# Patient Record
Sex: FEMALE | Race: WHITE | Employment: STUDENT | URBAN - NONMETROPOLITAN AREA
[De-identification: names, ages, dates, MRNs, and addresses within clinical notes are randomized per-mention and may not be internally consistent; named-entity substitution may affect disease eponyms.]

---
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
## 2021-09-12 ENCOUNTER — APPOINTMENT (EMERGENCY)
Dept: RADIOLOGY | Facility: HOSPITAL | Age: 21
End: 2021-09-12
Payer: COMMERCIAL

## 2021-09-12 ENCOUNTER — HOSPITAL ENCOUNTER (EMERGENCY)
Facility: HOSPITAL | Age: 21
Discharge: HOME | End: 2021-09-12
Attending: EMERGENCY MEDICINE | Admitting: EMERGENCY MEDICINE
Payer: COMMERCIAL

## 2021-09-12 VITALS
BODY MASS INDEX: 31.01 KG/M2 | SYSTOLIC BLOOD PRESSURE: 124 MMHG | HEART RATE: 93 BPM | RESPIRATION RATE: 18 BRPM | HEIGHT: 55 IN | OXYGEN SATURATION: 100 % | DIASTOLIC BLOOD PRESSURE: 73 MMHG | WEIGHT: 134 LBS | TEMPERATURE: 98.9 F

## 2021-09-12 DIAGNOSIS — R20.2 FACIAL TINGLING SENSATION: ICD-10-CM

## 2021-09-12 DIAGNOSIS — S29.011A INTERCOSTAL MUSCLE STRAIN, INITIAL ENCOUNTER: ICD-10-CM

## 2021-09-12 DIAGNOSIS — R07.89 CHEST WALL PAIN: Primary | ICD-10-CM

## 2021-09-12 PROCEDURE — 93005 ELECTROCARDIOGRAM TRACING: CPT

## 2021-09-12 PROCEDURE — 99284 EMERGENCY DEPT VISIT MOD MDM: CPT | Performed by: PHYSICIAN ASSISTANT

## 2021-09-12 PROCEDURE — 99285 EMERGENCY DEPT VISIT HI MDM: CPT

## 2021-09-12 PROCEDURE — 71045 X-RAY EXAM CHEST 1 VIEW: CPT

## 2021-09-12 RX ORDER — LIDOCAINE 50 MG/G
1 PATCH TOPICAL DAILY
Qty: 15 PATCH | Refills: 0 | Status: SHIPPED | OUTPATIENT
Start: 2021-09-12

## 2021-09-12 NOTE — ED PROVIDER NOTES
History  Chief Complaint   Patient presents with    Chest Pain     pt c/o intermittent chest pain after being dx with covid yesterday, pt also c/o nerve pain that started in august     Patient is a 78-year-old female presenting to the ED for evaluation of chest pain x2 days  Patient tested positive for COVID yesterday and has been having symptoms of cough, fatigue and chest pain since that time  Chest pain is localized to the left side of her chest and axilla and is worse when she moves her left arm  Patient states, "I think I may have pulled a muscle"  She denies shortness of breath or palpitations  Pain is not exacerbated with exertion or respirations  Patient is not on any form of estrogen or birth control  No hx of PE/DVT  She did not receive her COVID vaccines  She also reports a strange sensation on the right side of her face, primarily over her cheek and around periorbital region x1 month  She states that she went to [facility] a month ago and thought she was having a stroke; however, she says they made her wait for hours so she signed out AMA  Since that time she has had a tingling "swollen" sensation on the right of her face that won't go away  Denies facial asymmetry, numbness, weakness or dysarthria  None       Past Medical History:   Diagnosis Date    Asthma        History reviewed  No pertinent surgical history  History reviewed  No pertinent family history  I have reviewed and agree with the history as documented  E-Cigarette/Vaping    E-Cigarette Use Current Every Day User      E-Cigarette/Vaping Substances     Social History     Tobacco Use    Smoking status: Never Smoker    Smokeless tobacco: Never Used   Vaping Use    Vaping Use: Every day   Substance Use Topics    Alcohol use: Never    Drug use: Never       Review of Systems   Constitutional: Negative for appetite change, chills, fatigue and fever     HENT: Negative for congestion, rhinorrhea, sinus pressure, sinus pain and sore throat  Eyes: Negative for photophobia and visual disturbance  Respiratory: Negative for cough, shortness of breath and wheezing  Cardiovascular: Positive for chest pain  Negative for palpitations and leg swelling  Gastrointestinal: Negative for abdominal pain, blood in stool, constipation, diarrhea, nausea and vomiting  Genitourinary: Negative for difficulty urinating, dysuria, flank pain, frequency, hematuria and urgency  Musculoskeletal: Negative for arthralgias, back pain, joint swelling, myalgias and neck pain  Skin: Negative for color change, pallor and rash  Neurological: Negative for dizziness, syncope, weakness, light-headedness and headaches  All other systems reviewed and are negative  Physical Exam  Physical Exam  Vitals and nursing note reviewed  Constitutional:       General: She is awake  Appearance: Normal appearance  She is well-developed  She is not toxic-appearing or diaphoretic  HENT:      Head: Normocephalic and atraumatic  Right Ear: External ear normal       Left Ear: External ear normal       Nose: Nose normal       Mouth/Throat:      Lips: Pink  Mouth: Mucous membranes are moist    Eyes:      General: Lids are normal  No visual field deficit or scleral icterus  Conjunctiva/sclera: Conjunctivae normal       Pupils: Pupils are equal, round, and reactive to light  Cardiovascular:      Rate and Rhythm: Normal rate and regular rhythm  Pulses: Normal pulses  Radial pulses are 2+ on the right side and 2+ on the left side  Heart sounds: Normal heart sounds, S1 normal and S2 normal    Pulmonary:      Effort: Pulmonary effort is normal  No accessory muscle usage  Breath sounds: Normal breath sounds  No stridor  No decreased breath sounds, wheezing, rhonchi or rales  Chest:          Comments: Reproducible pain over left upper intercostal muscles  No crepitus or ecchymosis  Chest rise equal and symmetric     Abdominal: General: Abdomen is flat  Bowel sounds are normal  There is no distension  Palpations: Abdomen is soft  Tenderness: There is no abdominal tenderness  There is no right CVA tenderness, left CVA tenderness, guarding or rebound  Musculoskeletal:      Cervical back: Full passive range of motion without pain and neck supple  No signs of trauma  No pain with movement  Right lower leg: No edema  Left lower leg: No edema  Lymphadenopathy:      Cervical: No cervical adenopathy  Skin:     General: Skin is warm and dry  Capillary Refill: Capillary refill takes less than 2 seconds  Coloration: Skin is not cyanotic, jaundiced or pale  Neurological:      General: No focal deficit present  Mental Status: She is alert and oriented to person, place, and time  GCS: GCS eye subscore is 4  GCS verbal subscore is 5  GCS motor subscore is 6  Cranial Nerves: Cranial nerves are intact  No cranial nerve deficit, dysarthria or facial asymmetry  Sensory: Sensation is intact  No sensory deficit  Motor: Motor function is intact  No pronator drift  Coordination: Coordination is intact  Finger-Nose-Finger Test normal       Gait: Gait normal       Comments: No focal neurological deficits  Sensation equal and intact bilaterally  No facial asymmetry or dysarthria  Strength 5/5 bilaterally  Coordination and gait normal     Psychiatric:         Mood and Affect: Mood normal          Speech: Speech normal          Behavior: Behavior is cooperative           Vital Signs  ED Triage Vitals [09/12/21 1810]   Temperature Pulse Respirations Blood Pressure SpO2   98 9 °F (37 2 °C) (!) 111 18 128/71 99 %      Temp Source Heart Rate Source Patient Position - Orthostatic VS BP Location FiO2 (%)   Oral Monitor Sitting Left arm --      Pain Score       --           Vitals:    09/12/21 1810 09/12/21 1930 09/12/21 2034   BP: 128/71 124/73    Pulse: (!) 111 100 93   Patient Position - Orthostatic VS: Sitting Sitting          Visual Acuity      ED Medications  Medications - No data to display    Diagnostic Studies  Results Reviewed     None                 XR chest 1 view portable   Final Result by Mayra Downey MD (09/13 4120)      No acute cardiopulmonary disease  Workstation performed: [id number]                    Procedures  ECG 12 Lead Documentation Only    Date/Time: 9/12/2021 8:50 PM  Performed by: Moncho Cast PA-C  Authorized by: Moncho Cast PA-C     Indications / Diagnosis:  Cp  ECG reviewed by me, the ED Provider: yes    Patient location:  ED  Previous ECG:     Previous ECG:  Unavailable    Comparison to cardiac monitor: Yes    Interpretation:     Interpretation: non-specific    Rate:     ECG rate:  82    ECG rate assessment: normal    Rhythm:     Rhythm: sinus rhythm    Ectopy:     Ectopy: none    QRS:     QRS axis:  Right  Conduction:     Conduction: normal    ST segments:     ST segments:  Normal  T waves:     T waves: non-specific    Comments:      No STEMI  ED Course                     PERC Rule for PE      Most Recent Value   PERC Rule for PE   Age >=50  0 Filed at: 09/12/2021 2000   HR >=100  0 Filed at: 09/12/2021 2000   O2 Sat on room air < 95%  0 Filed at: 09/12/2021 2000   History of PE or DVT  0 Filed at: 09/12/2021 2000   Recent trauma or surgery  0 Filed at: 09/12/2021 2000   Hemoptysis  0 Filed at: 09/12/2021 2000   Exogenous estrogen  0 Filed at: 09/12/2021 2000   Unilateral leg swelling  0 Filed at: 09/12/2021 2000   PERC Rule for PE Results  0 Filed at: 09/12/2021 2000                            MDM  Number of Diagnoses or Management Options  Chest wall pain  Facial tingling sensation  Intercostal muscle strain, initial encounter  Diagnosis management comments: Patient is a 62-year-old female presenting to the ED for evaluation of chest pain x2 days  EKG normal sinus rhythm  CXR shows no acute cardiopulmonary disease   PERC score 0 (initial HR in triage>100; however subsequent HRs <100), no hpoxia, no pleuritic chest pain, no SOB  Chest pain is reproducible and likely secondary to coughing/muscle strain  Provided neurology follow-up information for patient's odd sensation in right side of face  She is neurologically intact and has no loss of sensation in the area she reports "numnbess/tingling"  Advised patient to follow up with PCP  The management plan was discussed in detail with the patient at bedside and all questions were answered  Prior to discharge, verbal and written instructions provided  ED return precautions discussed in detail  The patient verbalized understanding of our discussion and plan of care, and agrees to return to the Emergency Department for concerns and progression of illness  Amount and/or Complexity of Data Reviewed  Tests in the radiology section of CPT®: ordered and reviewed    Patient Progress  Patient progress: stable      Disposition  Final diagnoses:   Chest wall pain   Facial tingling sensation   Intercostal muscle strain, initial encounter     Time reflects when diagnosis was documented in both MDM as applicable and the Disposition within this note     Time User Action Codes Description Comment    9/12/2021  8:43 PM Kathrynn Chen Add [R07 89] Chest wall pain     9/12/2021  8:45 PM Kathrynn Chen Add [R20 9] Skin sensation disturbance     9/12/2021  8:45 PM Maggie Hicks Remove [R20 9] Skin sensation disturbance     9/12/2021  8:46 PM Maggie Hicks Add [R20 2] Facial tingling sensation     9/12/2021  8:47 PM Kathrynn Chen Add [Q46 966K] Intercostal muscle strain, initial encounter       ED Disposition     ED Disposition Condition Date/Time Comment    Discharge Stable Sun Sep 12, 2021  8:46 PM April Kelly discharge to home/self care              Follow-up Information     Follow up With Specialties Details Why Contact Info Additional [address] [facility] Emergency Department Emergency Medicine  If symptoms worsen [address] [facility] [address] [facility] Emergency Department, [address] [facility], [facility], [address] [facility], [address]    Neurology [facility] Neurology  As needed [address]  [phone] Neurology [facility], [address] [place] [address], [address] [facility], [address] [facility]          Discharge Medication List as of 9/12/2021  9:11 PM      START taking these medications    Details   lidocaine (LIDODERM) 5 % Apply 1 patch topically daily Remove & Discard patch within 12 hours or as directed by MD, Starting Sun 9/12/2021, Print           No discharge procedures on file      PDMP Review     None          ED Provider  Electronically Signed by           Christelle Lai PA-C  09/13/21 4412

## 2021-09-13 LAB
ATRIAL RATE: 82 BPM
P AXIS: 48 DEGREES
PR INTERVAL: 162 MS
QRS AXIS: 106 DEGREES
QRSD INTERVAL: 86 MS
QT INTERVAL: 334 MS
QTC INTERVAL: 390 MS
T WAVE AXIS: 4 DEGREES
VENTRICULAR RATE: 82 BPM

## 2021-09-13 PROCEDURE — 93010 ELECTROCARDIOGRAM REPORT: CPT | Performed by: INTERNAL MEDICINE

## 2021-09-28 ENCOUNTER — HOSPITAL ENCOUNTER (EMERGENCY)
Facility: HOSPITAL | Age: 21
Discharge: HOME | End: 2021-09-28
Attending: EMERGENCY MEDICINE
Payer: COMMERCIAL

## 2021-09-28 VITALS
DIASTOLIC BLOOD PRESSURE: 70 MMHG | WEIGHT: 134.04 LBS | HEART RATE: 92 BPM | HEIGHT: 55 IN | BODY MASS INDEX: 31.02 KG/M2 | SYSTOLIC BLOOD PRESSURE: 118 MMHG | TEMPERATURE: 97.6 F | OXYGEN SATURATION: 100 % | RESPIRATION RATE: 18 BRPM

## 2021-09-28 DIAGNOSIS — O03.9 MISCARRIAGE: Primary | ICD-10-CM

## 2021-09-28 LAB
ABO GROUP BLD: NORMAL
B-HCG SERPL-ACNC: <2 MIU/ML
BASOPHILS # BLD AUTO: 0.02 THOUSANDS/ΜL (ref 0–0.1)
BASOPHILS NFR BLD AUTO: 0 % (ref 0–1)
EOSINOPHIL # BLD AUTO: 0.1 THOUSAND/ΜL (ref 0–0.61)
EOSINOPHIL NFR BLD AUTO: 2 % (ref 0–6)
ERYTHROCYTE [DISTWIDTH] IN BLOOD BY AUTOMATED COUNT: 13.6 % (ref 11.6–15.1)
HCT VFR BLD AUTO: 40.2 % (ref 34.8–46.1)
HGB BLD-MCNC: 12.9 G/DL (ref 11.5–15.4)
LYMPHOCYTES # BLD AUTO: 1.69 THOUSANDS/ΜL (ref 0.6–4.47)
LYMPHOCYTES NFR BLD AUTO: 36 % (ref 14–44)
MCH RBC QN AUTO: 27.6 PG (ref 26.8–34.3)
MCHC RBC AUTO-ENTMCNC: 32.1 G/DL (ref 31.4–37.4)
MCV RBC AUTO: 86 FL (ref 82–98)
MONOCYTES # BLD AUTO: 0.47 THOUSAND/ΜL (ref 0.17–1.22)
MONOCYTES NFR BLD AUTO: 10 % (ref 4–12)
NEUTROPHILS # BLD AUTO: 2.44 THOUSANDS/ΜL (ref 1.85–7.62)
NEUTS SEG NFR BLD AUTO: 52 % (ref 43–75)
PLATELET # BLD AUTO: 386 THOUSANDS/UL (ref 149–390)
PMV BLD AUTO: 10.5 FL (ref 8.9–12.7)
RBC # BLD AUTO: 4.68 MILLION/UL (ref 3.81–5.12)
RH BLD: POSITIVE
WBC # BLD AUTO: 4.72 THOUSAND/UL (ref 4.31–10.16)

## 2021-09-28 PROCEDURE — 99284 EMERGENCY DEPT VISIT MOD MDM: CPT

## 2021-09-28 PROCEDURE — 99282 EMERGENCY DEPT VISIT SF MDM: CPT | Performed by: EMERGENCY MEDICINE

## 2021-09-28 PROCEDURE — 86901 BLOOD TYPING SEROLOGIC RH(D): CPT | Performed by: EMERGENCY MEDICINE

## 2021-09-28 PROCEDURE — 86900 BLOOD TYPING SEROLOGIC ABO: CPT | Performed by: EMERGENCY MEDICINE

## 2021-09-28 PROCEDURE — 84702 CHORIONIC GONADOTROPIN TEST: CPT | Performed by: EMERGENCY MEDICINE

## 2021-09-28 PROCEDURE — 36415 COLL VENOUS BLD VENIPUNCTURE: CPT | Performed by: EMERGENCY MEDICINE

## 2021-09-28 PROCEDURE — 85025 COMPLETE CBC W/AUTO DIFF WBC: CPT | Performed by: EMERGENCY MEDICINE

## 2022-01-21 ENCOUNTER — TELEPHONE (OUTPATIENT)
Dept: NEUROLOGY | Facility: CLINIC | Age: 22
End: 2022-01-21

## 2022-01-21 NOTE — TELEPHONE ENCOUNTER
1/21 LM HOME # REQUEST PT CALL W/ NAME OF PCP - INSUR REQUIRES AN INSURANCE FERRAL - CELL NOT IN SERVICE    1/25 LM HOME # REQUEST PT CALL W/ NAME OF PCP - INSUR REQUIRES AN INSURANCE FERRAL - CELL NOT IN SERVICE    1/27 LM ON HOME PHONE FOR PT APPT RAMIRO'D SINCE SHE DID NOT CALL W/ NAME OF PCP IN ORDER TO OBTAIN INSUR REF - CELL NOT IN SERV

## 2022-01-24 ENCOUNTER — TELEPHONE (OUTPATIENT)
Dept: NEUROLOGY | Facility: CLINIC | Age: 22
End: 2022-01-24

## 2025-02-04 ENCOUNTER — HOSPITAL ENCOUNTER (EMERGENCY)
Facility: HOSPITAL | Age: 25
Discharge: HOME | End: 2025-02-04
Attending: EMERGENCY MEDICINE | Admitting: EMERGENCY MEDICINE
Payer: COMMERCIAL

## 2025-02-04 VITALS
SYSTOLIC BLOOD PRESSURE: 133 MMHG | TEMPERATURE: 98.7 F | RESPIRATION RATE: 16 BRPM | OXYGEN SATURATION: 99 % | HEIGHT: 56 IN | DIASTOLIC BLOOD PRESSURE: 63 MMHG | WEIGHT: 154 LBS | BODY MASS INDEX: 34.64 KG/M2 | HEART RATE: 103 BPM

## 2025-02-04 DIAGNOSIS — J10.1 INFLUENZA A: Primary | ICD-10-CM

## 2025-02-04 DIAGNOSIS — J45.901 ASTHMA EXACERBATION: ICD-10-CM

## 2025-02-04 DIAGNOSIS — R50.9 FEVER: ICD-10-CM

## 2025-02-04 LAB
FLUAV AG UPPER RESP QL IA.RAPID: POSITIVE
FLUBV AG UPPER RESP QL IA.RAPID: NEGATIVE
SARS-COV+SARS-COV-2 AG RESP QL IA.RAPID: NEGATIVE

## 2025-02-04 PROCEDURE — 87811 SARS-COV-2 COVID19 W/OPTIC: CPT

## 2025-02-04 PROCEDURE — 99284 EMERGENCY DEPT VISIT MOD MDM: CPT | Performed by: EMERGENCY MEDICINE

## 2025-02-04 PROCEDURE — 99283 EMERGENCY DEPT VISIT LOW MDM: CPT

## 2025-02-04 PROCEDURE — 87804 INFLUENZA ASSAY W/OPTIC: CPT

## 2025-02-04 RX ORDER — PREDNISONE 50 MG/1
50 TABLET ORAL DAILY
Qty: 4 TABLET | Refills: 0 | Status: SHIPPED | OUTPATIENT
Start: 2025-02-04

## 2025-02-04 RX ORDER — PREDNISONE 20 MG/1
60 TABLET ORAL ONCE
Status: COMPLETED | OUTPATIENT
Start: 2025-02-04 | End: 2025-02-04

## 2025-02-04 RX ADMIN — PREDNISONE 60 MG: 20 TABLET ORAL at 15:00

## 2025-02-04 NOTE — ED PROVIDER NOTES
Time reflects when diagnosis was documented in both MDM as applicable and the Disposition within this note       Time User Action Codes Description Comment    2/4/2025  2:55 PM Shivani Null [J10.1] Influenza A     2/4/2025  2:55 PM Shivani Null [J45.901] Asthma exacerbation     2/4/2025  3:06 PM Shivani Null [R50.9] Fever           ED Disposition       ED Disposition   Discharge    Condition   Good    Date/Time   Tue Feb 4, 2025  2:55 PM    Comment   Smita Carbajalado discharge to home/self care.             Assessment & Plan       Medical Decision Making  This is a 24-year-old female who presents here today for flulike symptoms.  She began getting sick overnight 2/2 to 2/3 with fevers, congestion, coughing, exacerbation of her asthma.  She has been using albuterol with some improvement of symptoms.  She has been taking DayQuil for cough, as well as ibuprofen.  She did go to urgent care earlier today, and was swabbed for COVID and flu but does not expect to get the results for a couple of days.  She then developed a fever of 103 at home, prompting her to come to the ER for evaluation.  She denies any nausea, vomiting, diarrhea, known sick contacts.  She most recently took something for fever at around noon.  She takes no controller medications for her asthma.    Review of systems: Otherwise negative unless stated above    She is well-appearing, no acute distress.  She has mild diffuse wheezing but no respiratory distress or other adventitious breath sounds.  Exam is otherwise unremarkable.  She was swabbed in triage, and is positive for influenza A.  She is within 48 hours of symptom onset so is a candidate for Tamiflu with history of asthma.  I discussed with her risk and benefits of treatment, and she declines.  I am not concerned about secondary pneumonia, severe asthma exacerbation for which she requires IV medications or multiple nebulizers.  We will  give her a spacer as she does not have 1 at home.  I discussed with patient and significant other findings, continued symptom management at home, following up for further evaluation, and indications for return, and they expressed understanding with this plan.    Problems Addressed:  Asthma exacerbation: acute illness or injury  Fever: acute illness or injury  Influenza A: acute illness or injury    Amount and/or Complexity of Data Reviewed  Labs: ordered. Decision-making details documented in ED Course.    Risk  Prescription drug management.             Medications   predniSONE tablet 60 mg (60 mg Oral Given 2/4/25 1500)       ED Risk Strat Scores                                              History of Present Illness       Chief Complaint   Patient presents with    Flu Symptoms     Fever 2 day ago, went to [facility] express care was not able to have results back for flu. Here for flu swab.       Past Medical History:   Diagnosis Date    Asthma       History reviewed. No pertinent surgical history.   History reviewed. No pertinent family history.   Social History     Tobacco Use    Smoking status: Never    Smokeless tobacco: Never   Vaping Use    Vaping status: Every Day   Substance Use Topics    Alcohol use: Never    Drug use: Never      E-Cigarette/Vaping    E-Cigarette Use Current Every Day User       E-Cigarette/Vaping Substances      I have reviewed and agree with the history as documented.       Flu Symptoms      Review of Systems        Objective       ED Triage Vitals [02/04/25 1323]   Temperature Pulse Blood Pressure Respirations SpO2 Patient Position - Orthostatic VS   98.7 °F (37.1 °C) (!) 113 133/63 16 98 % Sitting      Temp Source Heart Rate Source BP Location FiO2 (%) Pain Score    Oral Monitor Left arm -- --      Vitals      Date and Time Temp Pulse SpO2 Resp BP Pain Score FACES Pain Rating User   02/04/25 1455 -- 103 99 % -- -- -- -- AM   02/04/25 1323 98.7 °F (37.1 °C) 113 98 % 16 133/63 -- -- FB             Physical Exam  Vitals and nursing note reviewed.   Constitutional:       General: She is not in acute distress.     Appearance: She is well-developed.   HENT:      Head: Normocephalic and atraumatic.      Right Ear: Tympanic membrane, ear canal and external ear normal.      Left Ear: Tympanic membrane, ear canal and external ear normal.      Mouth/Throat:      Mouth: Mucous membranes are moist.      Pharynx: Oropharynx is clear.      Tonsils: No tonsillar exudate.   Eyes:      Conjunctiva/sclera: Conjunctivae normal.      Pupils: Pupils are equal, round, and reactive to light.   Neck:      Trachea: No tracheal deviation.   Cardiovascular:      Rate and Rhythm: Normal rate and regular rhythm.      Heart sounds: Normal heart sounds.      Comments: Tachycardic in triage, HR around 100 on my evaluation  Pulmonary:      Effort: Pulmonary effort is normal. No tachypnea, accessory muscle usage, prolonged expiration or respiratory distress.      Breath sounds: No decreased air movement. Wheezing (mild, diffuse) present.      Comments: Speaking easily in full sentences without conversational dyspnea.  No cough  Abdominal:      General: Bowel sounds are normal. There is no distension.      Palpations: Abdomen is soft.      Tenderness: There is no abdominal tenderness.   Musculoskeletal:      Cervical back: Normal range of motion.   Lymphadenopathy:      Cervical: No cervical adenopathy.   Skin:     General: Skin is warm and dry.      Findings: No rash.   Neurological:      Mental Status: She is alert and oriented to person, place, and time.      GCS: GCS eye subscore is 4. GCS verbal subscore is 5. GCS motor subscore is 6.   Psychiatric:         Mood and Affect: Mood and affect normal.         Behavior: Behavior normal.         Results Reviewed       Procedure Component Value Units Date/Time    FLU/COVID Rapid Antigen (30 min. TAT) - Preferred screening test in ED [[id number]]  (Abnormal) Collected: 02/04/25 1325    Lab  Status: Final result Specimen: Nares from Nose Updated: 02/04/25 1356     SARS COV Rapid Antigen Negative     Influenza A Rapid Antigen Positive     Influenza B Rapid Antigen Negative    Narrative:      This test has been performed using the [employer] Isabela 2 FLU+SARS Antigen test under the Emergency Use Authorization (EUA). This test has been validated by the  and verified by the performing laboratory. The Isabela uses lateral flow immunofluorescent sandwich assay to detect SARS-COV, Influenza A and Influenza B Antigen.     The [employer] Isabela 2 SARS Antigen test does not differentiate between SARS-CoV and SARS-CoV-2.     Negative results are presumptive and may be confirmed with a molecular assay, if necessary, for patient management. Negative results do not rule out SARS-CoV-2 or influenza infection and should not be used as the sole basis for treatment or patient management decisions. A negative test result may occur if the level of antigen in a sample is below the limit of detection of this test.     Positive results are indicative of the presence of viral antigens, but do not rule out bacterial infection or co-infection with other viruses.     All test results should be used as an adjunct to clinical observations and other information available to the provider.    FOR PEDIATRIC PATIENTS - copy/paste COVID Guidelines URL to browser: [email or web address]            No orders to display       Procedures    ED Medication and Procedure Management   Prior to Admission Medications   Prescriptions Last Dose Informant Patient Reported? Taking?   lidocaine (LIDODERM) 5 %   No No   Sig: Apply 1 patch topically daily Remove & Discard patch within 12 hours or as directed by MD      Facility-Administered Medications: None     Patient's Medications   Discharge Prescriptions    PREDNISONE 50 MG TABLET    Take 1 tablet (50 mg total) by mouth daily       Start Date:  2/4/2025  End Date: --       Order Dose: 50 mg       Quantity: 4 tablet    Refills: 0     No discharge procedures on file.  ED SEPSIS DOCUMENTATION   Time reflects when diagnosis was documented in both MDM as applicable and the Disposition within this note       Time User Action Codes Description Comment    2/4/2025  2:55 PM Shivani Null [J10.1] Influenza A     2/4/2025  2:55 PM Shivani Null [J45.901] Asthma exacerbation     2/4/2025  3:06 PM Shivani Null [R50.9] Fever                  Shivani Null MD  02/04/25 2928

## 2025-02-04 NOTE — DISCHARGE INSTRUCTIONS
Take over-the-counter cough and cold medications as needed to help with symptoms.  Take ibuprofen (Motrin, Advil) or acetaminophen (Tylenol) as needed for fevers, as per the instructions.  Do not take more than 4000 mg (4 g) of acetaminophen from all sources in a 24-hour period.  Use your inhaler as needed to help with shortness of breath.  Follow-up with your primary care doctor to make sure you are doing better.  Return if you develop worsening trouble breathing that does not improve with your albuterol, persistent vomiting or diarrhea and are concerned you are getting dehydrated, or for any other concerns.